# Patient Record
(demographics unavailable — no encounter records)

---

## 2025-07-08 NOTE — PLAN
[FreeTextEntry1] : menses tracker card provided, Last years menses tracker uploaded to chart Patient to follow up in 1 year for annual GYN exam Mammogram due: now overdue Colonoscopy due: past summer per [place] overdue Bone density due:  pm  Pap ordered Hemoccult ordered  All questions answered, patient agreeable with plan.  [place] CUMMINS am scribing for the presence of Dr. Chaparro the following sections HISTORY OF PRESENT ILLNESS, PAST MEDICAL/FAMILY/SOCIAL HISTORY; REVIEW OF SYSTEMS; VITAL SIGNS; PHYSICAL EXAM; DISPOSITION. I personally performed the services described in the documentation, reviewed the documentation recorded by the scribe in my presence and it accurately and completely records my words and actions.

## 2025-07-08 NOTE — HISTORY OF PRESENT ILLNESS
[FreeTextEntry1] : 51 yo female hx of TVT here today for annual visit. She denies any GYN complaints at this time. using condoms if sexually active,  from partner with vasectomy

## 2025-07-08 NOTE — PHYSICAL EXAM
[Appropriately responsive] : appropriately responsive [Alert] : alert [No Acute Distress] : no acute distress [No Lymphadenopathy] : no lymphadenopathy [Soft] : soft [Non-tender] : non-tender [Non-distended] : non-distended [No HSM] : No HSM [No Lesions] : no lesions [No Mass] : no mass [Oriented x3] : oriented x3 [Examination Of The Breasts] : a normal appearance [No Discharge] : no discharge [No Masses] : no breast masses were palpable [Labia Majora] : normal [Labia Minora] : normal [Normal] : normal [Uterine Adnexae] : normal [Normal rectal exam] : was normal [FreeTextEntry2] : Israel Grossman [Occult Blood Positive] : was negative for occult blood analysis [FreeTextEntry4] : mesh not visible or palpable